# Patient Record
Sex: MALE | Race: WHITE | NOT HISPANIC OR LATINO | Employment: OTHER | URBAN - METROPOLITAN AREA
[De-identification: names, ages, dates, MRNs, and addresses within clinical notes are randomized per-mention and may not be internally consistent; named-entity substitution may affect disease eponyms.]

---

## 2021-01-01 ENCOUNTER — PRE VISIT (OUTPATIENT)
Dept: UROLOGY | Facility: CLINIC | Age: 81
End: 2021-01-01
Payer: MEDICARE

## 2021-01-01 ENCOUNTER — TELEPHONE (OUTPATIENT)
Dept: UROLOGY | Facility: CLINIC | Age: 81
End: 2021-01-01
Payer: MEDICARE

## 2021-01-01 ENCOUNTER — VIRTUAL VISIT (OUTPATIENT)
Dept: UROLOGY | Facility: CLINIC | Age: 81
End: 2021-01-01
Payer: MEDICARE

## 2021-01-01 ENCOUNTER — ANCILLARY PROCEDURE (OUTPATIENT)
Dept: CT IMAGING | Facility: CLINIC | Age: 81
End: 2021-01-01
Attending: PHYSICIAN ASSISTANT
Payer: MEDICARE

## 2021-01-01 VITALS — BODY MASS INDEX: 29.31 KG/M2 | HEIGHT: 74 IN | WEIGHT: 228.4 LBS

## 2021-01-01 DIAGNOSIS — R33.8 BENIGN PROSTATIC HYPERPLASIA WITH URINARY RETENTION: ICD-10-CM

## 2021-01-01 DIAGNOSIS — N40.1 BENIGN PROSTATIC HYPERPLASIA WITH URINARY RETENTION: ICD-10-CM

## 2021-01-01 DIAGNOSIS — R33.9 URINARY RETENTION: Primary | ICD-10-CM

## 2021-01-01 DIAGNOSIS — R33.9 URINARY RETENTION: ICD-10-CM

## 2021-01-01 DIAGNOSIS — R31.0 GROSS HEMATURIA: ICD-10-CM

## 2021-01-01 PROCEDURE — 82565 ASSAY OF CREATININE: CPT | Performed by: PHYSICIAN ASSISTANT

## 2021-01-01 PROCEDURE — 99204 OFFICE O/P NEW MOD 45 MIN: CPT | Mod: 95 | Performed by: PHYSICIAN ASSISTANT

## 2021-01-01 PROCEDURE — 74178 CT ABD&PLV WO CNTR FLWD CNTR: CPT | Mod: GC | Performed by: RADIOLOGY

## 2021-01-01 RX ORDER — AMIODARONE HYDROCHLORIDE 200 MG/1
200 TABLET ORAL
COMMUNITY
Start: 2021-01-01

## 2021-01-01 RX ORDER — IPRATROPIUM BROMIDE AND ALBUTEROL SULFATE 2.5; .5 MG/3ML; MG/3ML
3 SOLUTION RESPIRATORY (INHALATION)
COMMUNITY
Start: 2021-01-01

## 2021-01-01 RX ORDER — CEPHALEXIN 500 MG/1
500 CAPSULE ORAL
COMMUNITY
Start: 2021-01-01 | End: 2021-01-01

## 2021-01-01 RX ORDER — PANTOPRAZOLE SODIUM 40 MG/1
40 TABLET, DELAYED RELEASE ORAL
COMMUNITY
Start: 2021-01-01

## 2021-01-01 RX ORDER — PSYLLIUM SEED
1 PACKET (EA) ORAL
COMMUNITY
Start: 2021-01-01

## 2021-01-01 RX ORDER — ATORVASTATIN CALCIUM 40 MG/1
40 TABLET, FILM COATED ORAL
COMMUNITY

## 2021-01-01 RX ORDER — IOPAMIDOL 755 MG/ML
135 INJECTION, SOLUTION INTRAVASCULAR ONCE
Status: COMPLETED | OUTPATIENT
Start: 2021-01-01 | End: 2021-01-01

## 2021-01-01 RX ORDER — GINSENG 100 MG
1 CAPSULE ORAL
COMMUNITY
Start: 2021-01-01

## 2021-01-01 RX ORDER — TRAZODONE HYDROCHLORIDE 50 MG/1
75 TABLET, FILM COATED ORAL
COMMUNITY
Start: 2021-01-01

## 2021-01-01 RX ORDER — ACETAMINOPHEN 325 MG/1
650 TABLET ORAL
COMMUNITY
Start: 2021-01-01

## 2021-01-01 RX ORDER — OXYCODONE HYDROCHLORIDE 5 MG/1
2.5 TABLET ORAL
COMMUNITY
Start: 2021-01-01

## 2021-01-01 RX ADMIN — IOPAMIDOL 135 ML: 755 INJECTION, SOLUTION INTRAVASCULAR at 14:06

## 2021-01-01 ASSESSMENT — MIFFLIN-ST. JEOR: SCORE: 1810.77

## 2021-12-13 NOTE — TELEPHONE ENCOUNTER
He should have video new appointment with Anaid Whitman for his first visit  She is available Wednesday Thursday and Friday many appointments. Sarai Schmitz LPN Staff Nurse

## 2021-12-13 NOTE — TELEPHONE ENCOUNTER
[facility] Call Center    Phone Message    May a detailed message be left on voicemail: yes     Reason for Call: Appointment Intake    Referring Provider Name: N/A  Diagnosis and/or Symptoms: Retention, unspecified hematuria    Urgent referral per Fredy f/u with uro in 3-5 days following hosptial stay at [facility]    Please call at [phone]    Action Taken: Message routed to:  [facility] ([facility] Uro    Travel Screening: Not Applicable

## 2021-12-14 NOTE — TELEPHONE ENCOUNTER
MEDICAL RECORDS REQUEST   [facility]  Urology Clinic  [address] [place] [address]  PHONE: [phone]  Fax: [phone]        FUTURE VISIT INFORMATION                                                   Roger Augustine, : 1940 scheduled for future visit at [facility]    APPOINTMENT INFORMATION:    Date: 12/15/2021    Provider:  Anaid Whitman PA    Reason for Visit/Diagnosis: Retention, unspecified hematuria    REFERRAL INFORMATION:    Referring provider: N/A    Specialty: N/A    Referring providers clinic:  N/A    Clinic contact number:  N/A    RECORDS REQUESTED FOR VISIT                                                     NOTES  STATUS/DETAILS   OFFICE NOTE from referring provider  no   OFFICE NOTE from other specialist  no   DISCHARGE SUMMARY from hospital  no   DISCHARGE REPORT from the ER  yes, 2021, 2021   OPERATIVE REPORT  no   MEDICATION LIST  yes, [facility]   LABS     URINALYSIS (UA)  yes   URINE CYTOLOGY  no     PRE-VISIT CHECKLIST      Record collection complete Yes   Appointment appropriately scheduled           (right time/right provider) Yes   Joint diagnostic appointment coordinated correctly          (ensure right order & amount of time) Yes   MyChart activation No   Questionnaire complete If no, please explain pending

## 2021-12-14 NOTE — CONFIDENTIAL NOTE
Reason for visit: consult     Relevant information: Jeannine, has indwelling cath, pyuria, hematuria    Records/imaging/labs/orders: in epic    Pt called: n/a    At Rooming: virtual

## 2021-12-15 PROBLEM — Z79.01 LONG TERM CURRENT USE OF ANTICOAGULANT THERAPY: Status: ACTIVE | Noted: 2018-07-31

## 2021-12-15 PROBLEM — Z97.4 WEARS HEARING AID: Status: ACTIVE | Noted: 2017-09-22

## 2021-12-15 PROBLEM — Z95.2 S/P TAVR (TRANSCATHETER AORTIC VALVE REPLACEMENT): Status: ACTIVE | Noted: 2018-09-06

## 2021-12-15 PROBLEM — N40.1 HYPERPLASIA OF PROSTATE WITH LOWER URINARY TRACT SYMPTOMS (LUTS): Status: ACTIVE | Noted: 2021-01-01

## 2021-12-15 PROBLEM — I45.2 BIFASCICULAR BUNDLE BRANCH BLOCK: Status: ACTIVE | Noted: 2021-01-01

## 2021-12-15 PROBLEM — R79.89 ELEVATED LFTS: Status: ACTIVE | Noted: 2021-01-01

## 2021-12-15 PROBLEM — L80 VITILIGO: Status: ACTIVE | Noted: 2021-01-01

## 2021-12-15 PROBLEM — N18.30 CKD (CHRONIC KIDNEY DISEASE) STAGE 3, GFR 30-59 ML/MIN (H): Status: ACTIVE | Noted: 2018-12-12

## 2021-12-15 PROBLEM — H90.3 SENSORINEURAL HEARING LOSS (SNHL) OF BOTH EARS: Status: ACTIVE | Noted: 2017-09-22

## 2021-12-15 PROBLEM — I21.4 NSTEMI (NON-ST ELEVATED MYOCARDIAL INFARCTION) (H): Status: ACTIVE | Noted: 2021-01-01

## 2021-12-15 NOTE — PROGRESS NOTES
Chief Complaint:   Urinary retention, hematuria         History of Present Illness:   Roger Augustine is a 81 year old male with a history of atrial fibrillation, CHF, COPD, and CKD who presents for evaluation of urinary retention.  Patient with recent hospitalization on 11/8/2021 for shortness of breath and fluid overload with acute on chronic CHF, and subsequently developed fevers and somnolence on 11/12 and was found to have entercoccus bacteremia with positive blood cultures.  He was evaluated by ID and started on ampicillin.  Patient was discharged from the hospital with a su catheter in place and this was removed at his TCU.  The patient then presented to the [facility] ED on 11/28/2021 with gross blood at the urethral meatus and inability to void since his su was removed.  PVR of 800 ml, and a su catheter was replaced in the ED.  UA was suspicious for infection but urine culture was subsequently negative for growth.  Patient then developed recurrent blood in his urine with intense bladder pressure, with urinary urgency and overflow incontinence bypassing the su catheter on 12/12/2021.  Patient then presented to the [facility] ED on that date at which time his su catheter was again replaced, and UA was suspicious for UTI so he was initiated on a 10 day course of Keflex.      The patient was seen with the assistance of Makayla Garcia, JU[facility]U clinic coordinator at his TCU.  The patient denies previous issues with urination prior to his hospitalization last month, including no urinary frequency, slowed urinary stream, or incomplete bladder emptying.  Patient does have an order for prn catheter irrigation, but this is not being done every day.  Per Makayla, the patient also had his catheter replaced a couple weeks ago due to occlusion.  His blood pressure is currently running 80-90/50's with orthostatic hypotension.           Past Medical History:   No past medical history on file.         Past  Surgical History:   No past surgical history on file.         Medications     Current Outpatient Medications   Medication     acetaminophen (TYLENOL) 325 MG tablet     amiodarone (PACERONE) 200 MG tablet     bacitracin 500 UNIT/GM OINT     cephALEXin (KEFLEX) 500 MG capsule     fluticasone-salmeterol (ADVAIR) 250-50 MCG/DOSE inhaler     ipratropium - albuterol 0.5 mg/2.5 mg/3 mL (DUONEB) 0.5-2.5 (3) MG/3ML neb solution     oxyCODONE (ROXICODONE) 5 MG tablet     pantoprazole (PROTONIX) 40 MG EC tablet     Psyllium (HYDROCIL) 95 % PACK     traZODone (DESYREL) 50 MG tablet     aspirin (ASA) 81 MG EC tablet     atorvastatin (LIPITOR) 40 MG tablet     No current facility-administered medications for this visit.            Family History:   No family history on file.         Social History:     Social History     Socioeconomic History     Marital status:      Spouse name: Not on file     Number of children: Not on file     Years of education: Not on file     Highest education level: Not on file   Occupational History     Not on file   Tobacco Use     Smoking status: Not on file     Smokeless tobacco: Not on file   Substance and Sexual Activity     Alcohol use: Not on file     Drug use: Not on file     Sexual activity: Not on file   Other Topics Concern     Not on file   Social History Narrative     Not on file     Social Determinants of Health     Financial Resource Strain: Not on file   Food Insecurity: Not on file   Transportation Needs: Not on file   Physical Activity: Not on file   Stress: Not on file   Social Connections: Not on file   Intimate Partner Violence: Not on file   Housing Stability: Not on file            Allergies:   Metformin         Review of Systems:  From intake questionnaire   Negative 14 system review except as noted on HPI, nurse's note.         Physical Exam:   Patient is a 81 year old male, resting in bed  General Appearance Adult: Alert, no acute distress, oriented  Lungs: no respiratory  distress, or pursed lip breathing  Heart: No obvious jugular venous distension present  Skin: no suspicious lesions or rashes  Neuro: Alert, oriented, speech and mentation normal  Further examination is deferred due to the nature of our visit.        Labs and Pathology:    I personally reviewed all applicable laboratory data and went over findings with patient  Significant for:    UA W/ SEDIMENT EXAM REFLEXED PER CRITERIA  Specimen:  Urine - Urine specimen (specimen)   Ref Range & Units 2 d ago Comments   COLOR                     Yellow Color Judy Abnormal      CLARITY                   Clear Clarity Turbid Abnormal      SPECIFIC GRAVITY,URINE    1.010, 1.015, 1.020, 1.025                 1.020     PH,URINE                  6.0, 7.0, 8.0, 5.5, 6.5, 7.5, 8.5                 8.5     UROBILINOGEN,QUALITATIVE  Normal EU/dl Normal     PROTEIN, URINE Negative mg/dL >=300 Abnormal      GLUCOSE, URINE Negative mg/dL 100 Abnormal      KETONES,URINE             Negative mg/dL Trace Abnormal      BILIRUBIN,URINE           Negative                 Abnormal Abnormal   A variety of metabolites and/or medications may result in a positive bilirubin result.  Clinical correlation is recommended.   OCCULT BLOOD,URINE        Negative                 Large Abnormal      NITRITE                   Negative                 Positive Abnormal      LEUKOCYTE ESTERASE        Negative                 Moderate Abnormal        URINALYSIS MICROSCOPIC  Specimen:  Urine - Urine specimen (specimen)   Ref Range & Units 2 d ago   RBC 0-2, None Seen /HPF >100 Abnormal     WBC 0-2, 3-5, None Seen /HPF >100 Abnormal     BACTERIA                  None Seen, Rare, Few Bacteria/HPF Many Abnormal     EPITHELIAL CELLS          None Seen, Few Epi/HPF Few    WHITE CELL CLUMPS (none) Present Abnormal       Contains abnormal data URINE CULTURE  Specimen:  Urine - Urine specimen (specimen)  Component 2 d ago   CULTURE  RESULT Abnormal     CULTURE  >100,000 CFU/mL  Escherichia coli    CULTURE  10,000-50,000 CFU/mL Proteus mirabilis          Imaging:    I personally reviewed all applicable imaging and went over findings with patient.  Significant for:    No results found for this or any previous visit.           Assessment and Plan:     Assessment: 81 year old male with urinary retention following hospitalization for acute exacerbation of CHF last month, and entercoccus bacteremia.  Patient has failed two TOV at his TCU with resulting replacement of a su catheter in the ED on 11/28/2021 and again on 12/12/2021.  Patient also with urethral bleeding and hematuria prior to su placement on 11/28/2021.  Urine culture negative from 11/28/2021 ruling out infection, but urine culture from most recent urinary retention episode positive for E. Coli and proteus mirabilis from 12/12/2021; patient currently on a 10 day course of Keflex for treatment.  Given his previous failed TOV, and likely prostatic bleeding, we will plan for continued su catheter, with plan for daily irrigation.  Suspect prostatic bleeding, so patient likely is not a good candidate for CIC.  Patient also with significant orthostatic hypotension, with baseline BP 80-90/50's, so we will plan to avoid alpha blockers and initiate dutasteride to reduce the risk for further hypotension.  We will also plan for CT urogram and cystoscopic evaluation with Dr. Ann or Dr. Salvador to evaluate for bladder outlet obstruction.  Orders as outlined below given to Makayla Garcia RN clinic coordinator at [facility].      Plan:  - Plan to keep su catheter at this time with daily bladder irrigation via su catheter.  Su catheter should be replaced monthly.    - Complete the full course of Keflex for treatment of UTI.   - Start dutasteride 0.5 mg once daily.   - Schedule CT urogram.   - Schedule cystoscopy with Dr. Salvador or Dr. nAn next available.        JACQUES Black  Department of Urology

## 2021-12-15 NOTE — PROGRESS NOTES
Roger is a 81 year old who is being evaluated via a billable video visit.      How would you like to obtain your AVS? Mail a copy  If the video visit is dropped, the invitation should be resent by: Send to e-mail at: [email or web address]  Will anyone else be joining your video visit? No      Video Start Time: 11:20 AM  Video-Visit Details    Type of service:  Video Visit    Video End Time:11:32 AM    Originating Location (pt. Location): Home    Distant Location (provider location):  [facility]OLOG[facility] [place]     Platform used for Video Visit: mSeller

## 2021-12-15 NOTE — LETTER
12/15/2021       RE: Roger Augustine  [address]  [place] [address]     Dear Colleague,    Thank you for referring your patient, Roger Augustine, to the [facility] [place] at [facility]. Please see a copy of my visit note below.    Roger is a 81 year old who is being evaluated via a billable video visit.      How would you like to obtain your AVS? Mail a copy  If the video visit is dropped, the invitation should be resent by: Send to e-mail at: [email or web address]  Will anyone else be joining your video visit? No      Video Start Time: 11:20 AM  Video-Visit Details    Type of service:  Video Visit    Video End Time:11:32 AM    Originating Location (pt. Location): Home    Distant Location (provider location):  [facility]     Platform used for Video Visit: Aktino          Chief Complaint:   Urinary retention, hematuria         History of Present Illness:   Roger Augustine is a 81 year old male with a history of atrial fibrillation, CHF, COPD, and CKD who presents for evaluation of urinary retention.  Patient with recent hospitalization on 11/8/2021 for shortness of breath and fluid overload with acute on chronic CHF, and subsequently developed fevers and somnolence on 11/12 and was found to have entercoccus bacteremia with positive blood cultures.  He was evaluated by ID and started on ampicillin.  Patient was discharged from the hospital with a su catheter in place and this was removed at his TCU.  The patient then presented to the [facility] ED on 11/28/2021 with gross blood at the urethral meatus and inability to void since his su was removed.  PVR of 800 ml, and a su catheter was replaced in the ED.  UA was suspicious for infection but urine culture was subsequently negative for growth.  Patient then developed recurrent blood in his urine with intense bladder pressure, with urinary  urgency and overflow incontinence bypassing the su catheter on 12/12/2021.  Patient then presented to the [facility] ED on that date at which time his su catheter was again replaced, and UA was suspicious for UTI so he was initiated on a 10 day course of Keflex.      The patient was seen with the assistance of Makayla Garcia RN clinic coordinator at his TCU.  The patient denies previous issues with urination prior to his hospitalization last month, including no urinary frequency, slowed urinary stream, or incomplete bladder emptying.  Patient does have an order for prn catheter irrigation, but this is not being done every day.  Per Makayla, the patient also had his catheter replaced a couple weeks ago due to occlusion.  His blood pressure is currently running 80-90/50's with orthostatic hypotension.           Past Medical History:   No past medical history on file.         Past Surgical History:   No past surgical history on file.         Medications     Current Outpatient Medications   Medication     acetaminophen (TYLENOL) 325 MG tablet     amiodarone (PACERONE) 200 MG tablet     bacitracin 500 UNIT/GM OINT     cephALEXin (KEFLEX) 500 MG capsule     fluticasone-salmeterol (ADVAIR) 250-50 MCG/DOSE inhaler     ipratropium - albuterol 0.5 mg/2.5 mg/3 mL (DUONEB) 0.5-2.5 (3) MG/3ML neb solution     oxyCODONE (ROXICODONE) 5 MG tablet     pantoprazole (PROTONIX) 40 MG EC tablet     Psyllium (HYDROCIL) 95 % PACK     traZODone (DESYREL) 50 MG tablet     aspirin (ASA) 81 MG EC tablet     atorvastatin (LIPITOR) 40 MG tablet     No current facility-administered medications for this visit.            Family History:   No family history on file.         Social History:     Social History     Socioeconomic History     Marital status:      Spouse name: Not on file     Number of children: Not on file     Years of education: Not on file     Highest education level: Not on file   Occupational History     Not on  file   Tobacco Use     Smoking status: Not on file     Smokeless tobacco: Not on file   Substance and Sexual Activity     Alcohol use: Not on file     Drug use: Not on file     Sexual activity: Not on file   Other Topics Concern     Not on file   Social History Narrative     Not on file     Social Determinants of Health     Financial Resource Strain: Not on file   Food Insecurity: Not on file   Transportation Needs: Not on file   Physical Activity: Not on file   Stress: Not on file   Social Connections: Not on file   Intimate Partner Violence: Not on file   Housing Stability: Not on file            Allergies:   Metformin         Review of Systems:  From intake questionnaire   Negative 14 system review except as noted on HPI, nurse's note.         Physical Exam:   Patient is a 81 year old male, resting in bed  General Appearance Adult: Alert, no acute distress, oriented  Lungs: no respiratory distress, or pursed lip breathing  Heart: No obvious jugular venous distension present  Skin: no suspicious lesions or rashes  Neuro: Alert, oriented, speech and mentation normal  Further examination is deferred due to the nature of our visit.        Labs and Pathology:    I personally reviewed all applicable laboratory data and went over findings with patient  Significant for:    UA W/ SEDIMENT EXAM REFLEXED PER CRITERIA  Specimen:  Urine - Urine specimen (specimen)   Ref Range & Units 2 d ago Comments   COLOR                     Yellow Color Judy Abnormal      CLARITY                   Clear Clarity Turbid Abnormal      SPECIFIC GRAVITY,URINE    1.010, 1.015, 1.020, 1.025                 1.020     PH,URINE                  6.0, 7.0, 8.0, 5.5, 6.5, 7.5, 8.5                 8.5     UROBILINOGEN,QUALITATIVE  Normal EU/dl Normal     PROTEIN, URINE Negative mg/dL >=300 Abnormal      GLUCOSE, URINE Negative mg/dL 100 Abnormal      KETONES,URINE             Negative mg/dL Trace Abnormal      BILIRUBIN,URINE           Negative                  Abnormal Abnormal   A variety of metabolites and/or medications may result in a positive bilirubin result.  Clinical correlation is recommended.   OCCULT BLOOD,URINE        Negative                 Large Abnormal      NITRITE                   Negative                 Positive Abnormal      LEUKOCYTE ESTERASE        Negative                 Moderate Abnormal        URINALYSIS MICROSCOPIC  Specimen:  Urine - Urine specimen (specimen)   Ref Range & Units 2 d ago   RBC 0-2, None Seen /HPF >100 Abnormal     WBC 0-2, 3-5, None Seen /HPF >100 Abnormal     BACTERIA                  None Seen, Rare, Few Bacteria/HPF Many Abnormal     EPITHELIAL CELLS          None Seen, Few Epi/HPF Few    WHITE CELL CLUMPS (none) Present Abnormal       Contains abnormal data URINE CULTURE  Specimen:  Urine - Urine specimen (specimen)  Component 2 d ago   CULTURE  RESULT Abnormal     CULTURE  >100,000 CFU/mL Escherichia coli    CULTURE  10,000-50,000 CFU/mL Proteus mirabilis          Imaging:    I personally reviewed all applicable imaging and went over findings with patient.  Significant for:    No results found for this or any previous visit.           Assessment and Plan:     Assessment: 81 year old male with urinary retention following hospitalization for acute exacerbation of CHF last month, and entercoccus bacteremia.  Patient has failed two TOV at his TCU with resulting replacement of a su catheter in the ED on 11/28/2021 and again on 12/12/2021.  Patient also with urethral bleeding and hematuria prior to su placement on 11/28/2021.  Urine culture negative from 11/28/2021 ruling out infection, but urine culture from most recent urinary retention episode positive for E. Coli and proteus mirabilis from 12/12/2021; patient currently on a 10 day course of Keflex for treatment.  Given his previous failed TOV, and likely prostatic bleeding, we will plan for continued su catheter, with plan for daily irrigation.  Suspect  prostatic bleeding, so patient likely is not a good candidate for CIC.  Patient also with significant orthostatic hypotension, with baseline BP 80-90/50's, so we will plan to avoid alpha blockers and initiate dutasteride to reduce the risk for further hypotension.  We will also plan for CT urogram and cystoscopic evaluation with Dr. Ann or Dr. Salvador to evaluate for bladder outlet obstruction.  Orders as outlined below given to Makayla Garcia RN clinic coordinator at [facility].      Plan:  - Plan to keep su catheter at this time with daily bladder irrigation via su catheter.  Su catheter should be replaced monthly.    - Complete the full course of Keflex for treatment of UTI.   - Start dutasteride 0.5 mg once daily.   - Schedule CT urogram.   - Schedule cystoscopy with Dr. Salvador or Dr. Ann next available.      JACQUES Black  Department of Urology

## 2021-12-15 NOTE — NURSING NOTE
"Chief Complaint   Patient presents with     Consult     Urinary retention; unspecified hematuria       Height 1.88 m (6' 2\"), weight 103.6 kg (228 lb 6.4 oz). Body mass index is 29.32 kg/m .    There is no problem list on file for this patient.      Allergies   Allergen Reactions     Metformin Diarrhea       No current outpatient medications on file.       Social History     Tobacco Use     Smoking status: Not on file     Smokeless tobacco: Not on file   Substance Use Topics     Alcohol use: Not on file     Drug use: Not on file       SYD Mcclellan  12/15/2021  10:49 AM  "

## 2021-12-15 NOTE — PATIENT INSTRUCTIONS
UROLOGY CLINIC VISIT PATIENT INSTRUCTIONS    - Plan to keep su catheter at this time with daily bladder irrigation via su catheter.  Su catheter should be replaced monthly.    - Complete the full course of Keflex for treatment of UTI.   - Start dutasteride 0.5 mg once daily.   - Schedule CT urogram.   - Schedule cystoscopy with Dr. Salvador or Dr. Ann next available.  Patient currently staying at [facility] in [place], please contact Makayla Garcia RN clinic coordinator, to assist with scheduling at [phone].     If you have any issues, questions or concerns in the meantime, do not hesitate to contact us at [phone] or via [email or web address].     It was a pleasure meeting with you today.  Thank you for allowing me and my team the privilege of caring for you today.  YOU are the reason we are here, and I truly hope we provided you with the excellent service you deserve.  Please let us know if there is anything else we can do for you so that we can be sure you are leaving completely satisfied with your care experience.    Anaid Whitman PA-C  Department of Urology

## 2021-12-17 NOTE — TELEPHONE ENCOUNTER
LVM for patient to Schedule CT urogram.   - Schedule cystoscopy with Dr. Salvador or Dr. Ann next available

## 2021-12-24 NOTE — TELEPHONE ENCOUNTER
Anaid Whitman PA  P [facility] [[email or web address] Urology Adult [facility]  Please call patient and inform him that his CT scan showed no urinary tract mass, but an enlarged prostate, which is likely contributing to his urinary retention.  I would recommend he follow up for a cystoscopy as recommended during our office visit.       Please also notify the patient that he had evidence of liver cirrhosis, with two small foci which the radiologist recommended follow up US.  It appears he has a known history of cirrhosis, so I would recommend follow up with his PCP regarding this to determine if an US is needed.  He was also noted to have a possible duodenal polyp for which I would recommend he follow up with his PCP about.  He was also noted to have some small bilateral pleural effusions, but he has known CHF with recent exacerbation.  He was also noted to have a large amount of stool in his colon and possible enteritis, but no signs of obstruction.  I have sent all this information to his PCP, who I recommend he follow up with outpatient regarding the non-urologic findings.     JACQUES Black on 12/23/2021 at 7:24 PM     Noted that pt is currently in the hospital for possible sepsis. Spoke to pt's son and relayed information from JACQUES Jaffe. He verbalized understanding and plans to connect with pt while in the hospital. No other questions at this time.     Marie Dolan, JUJU MSN

## 2022-03-30 LAB
CREAT BLD-MCNC: 0.6 MG/DL (ref 0.7–1.3)
GFR SERPL CREATININE-BSD FRML MDRD: >60 ML/MIN/1.73M2